# Patient Record
Sex: FEMALE | Race: BLACK OR AFRICAN AMERICAN
[De-identification: names, ages, dates, MRNs, and addresses within clinical notes are randomized per-mention and may not be internally consistent; named-entity substitution may affect disease eponyms.]

---

## 2023-03-18 ENCOUNTER — HOSPITAL ENCOUNTER (EMERGENCY)
Dept: HOSPITAL 12 - ER | Age: 78
Discharge: ANOTHER HOSPITAL | End: 2023-03-18
Payer: COMMERCIAL

## 2023-03-18 VITALS — WEIGHT: 180 LBS | BODY MASS INDEX: 35.34 KG/M2 | HEIGHT: 60 IN

## 2023-03-18 DIAGNOSIS — Z79.82: ICD-10-CM

## 2023-03-18 DIAGNOSIS — Z88.0: ICD-10-CM

## 2023-03-18 DIAGNOSIS — Z79.890: ICD-10-CM

## 2023-03-18 DIAGNOSIS — J45.909: ICD-10-CM

## 2023-03-18 DIAGNOSIS — R55: Primary | ICD-10-CM

## 2023-03-18 LAB
ALP SERPL-CCNC: 111 U/L (ref 50–136)
ALT SERPL W/O P-5'-P-CCNC: 8 U/L (ref 14–59)
AST SERPL-CCNC: < 5 U/L (ref 15–37)
BILIRUB DIRECT SERPL-MCNC: 0.1 MG/DL (ref 0–0.2)
BILIRUB SERPL-MCNC: 0.4 MG/DL (ref 0.2–1)
BUN SERPL-MCNC: 33 MG/DL (ref 7–18)
CHLORIDE SERPL-SCNC: 100 MMOL/L (ref 98–107)
CO2 SERPL-SCNC: 31 MMOL/L (ref 21–32)
CREAT SERPL-MCNC: 1.2 MG/DL (ref 0.6–1.3)
GLUCOSE SERPL-MCNC: 156 MG/DL (ref 74–106)
HCT VFR BLD AUTO: 41 % (ref 31.2–41.9)
MCH RBC QN AUTO: 28.6 UUG (ref 24.7–32.8)
MCV RBC AUTO: 90.6 FL (ref 75.5–95.3)
PLATELET # BLD AUTO: 272 K/UL (ref 179–408)
POTASSIUM SERPL-SCNC: 4.1 MMOL/L (ref 3.5–5.1)
WS STN SPEC: 6.8 G/DL (ref 6.4–8.2)

## 2023-03-18 PROCEDURE — A4663 DIALYSIS BLOOD PRESSURE CUFF: HCPCS

## 2023-03-18 NOTE — NUR
Patient does not wish to proceed with medical care recommended by Dr. Fry.  
Patient given information related to possible complications, up to and 
including death, which could occur as a result of leaving the hospital at this 
time.  Patient verbalizes understanding of risks involved due to leaving 
against medical advice.  Patient has signed AMA form.

## 2023-03-18 NOTE — NUR
Spoke with Antonella from [facility] who opened a case for patient. Stated that AVELINO Poole will call back for doctor to doctor.

## 2023-03-18 NOTE — NUR
Patient resting in bed comfortably with relative at the bedside. Patient 
attached to cardiac monitor.